# Patient Record
(demographics unavailable — no encounter records)

---

## 2025-03-21 NOTE — PHYSICAL EXAM
[No HSM] : no hepatosplenomegaly [No Rash or Lesion] : No rash or lesion [Alert] : alert [Oriented to Person] : oriented to person [Oriented to Place] : oriented to place [Oriented to Time] : oriented to time [Calm] : calm [Abdomen Masses] : No abdominal masses [Abdomen Tenderness] : ~T No ~M abdominal tenderness [de-identified] : Soft [de-identified] : External - right posterior anal skin tag and external hemorrhoid; ANTONINO - normal; anoscopy - grade 1 internal hemorrhoids; three small white spots/lesions (don't appear to be condylomas). [de-identified] : Normal [de-identified] : Normal [de-identified] : Normal [de-identified] : Normal [de-identified] : Normal [de-identified] : Normal

## 2025-03-21 NOTE — ASSESSMENT
[FreeTextEntry1] : 30 M w/ hx of genital HPV; consulting us for an anal lump. On exam, he has a right posterior anal skin tag/external hemorrhoid and three small white anal lesions (don't appear to be HPV). Plan: Recommended anoscopy, excision of external hemorrhoid/anal skin tag, excision and fulguration of anal lesions. Discussed possible complications and usual postop recovery. He would like to schedule. All questions answered.

## 2025-03-21 NOTE — HISTORY OF PRESENT ILLNESS
[FreeTextEntry1] : 30-year-old male with a history of HPV pt here with complaints of an anal mass at the orifice Never had an anal pap non painful 6 months in duration, hasn't changed in size Recently started to feel itchy. That's the only change Never drained Soft to touch Never had hemorrhoids before Moving his bowels regularly Denies bleeding  Never had a colonoscopy

## 2025-03-21 NOTE — PHYSICAL EXAM
[No HSM] : no hepatosplenomegaly [No Rash or Lesion] : No rash or lesion [Alert] : alert [Oriented to Person] : oriented to person [Oriented to Place] : oriented to place [Oriented to Time] : oriented to time [Calm] : calm [Abdomen Masses] : No abdominal masses [Abdomen Tenderness] : ~T No ~M abdominal tenderness [de-identified] : Soft [de-identified] : External - right posterior anal skin tag and external hemorrhoid; ANTONINO - normal; anoscopy - grade 1 internal hemorrhoids; three small white spots/lesions (don't appear to be condylomas). [de-identified] : Normal [de-identified] : Normal [de-identified] : Normal [de-identified] : Normal [de-identified] : Normal [de-identified] : Normal